# Patient Record
Sex: FEMALE | Race: OTHER
[De-identification: names, ages, dates, MRNs, and addresses within clinical notes are randomized per-mention and may not be internally consistent; named-entity substitution may affect disease eponyms.]

---

## 2021-09-25 ENCOUNTER — HOSPITAL ENCOUNTER (EMERGENCY)
Dept: HOSPITAL 41 - JD.ED | Age: 31
Discharge: HOME | End: 2021-09-25
Payer: SELF-PAY

## 2021-09-25 DIAGNOSIS — U07.1: Primary | ICD-10-CM

## 2021-09-25 PROCEDURE — U0002 COVID-19 LAB TEST NON-CDC: HCPCS

## 2021-09-25 NOTE — EDM.PDOC
ED HPI GENERAL MEDICAL PROBLEM





- General


Chief Complaint: General


Stated Complaint: COVID SYMPTOMS


Time Seen by Provider: 09/25/21 14:48


Source of Information: Reports: Patient, Family, RN Notes Reviewed


History Limitations: Reports: No Limitations





- History of Present Illness


INITIAL COMMENTS - FREE TEXT/NARRATIVE: 


Patient is a 31-year-old female presenting to the emergency department with 

complaints of 1 week history of fever, chills, body aches, nausea and cough.  

Patient reports that she did vomit 1 time yesterday but none since.  She has had

no diarrhea but does complain of body aches, particularly back pain.  Denies any

significant chest pain.  Her family is visiting from [place] and her entire 

family is sick with similar symptoms.  They did not receive Covid vaccinations. 

She denies any chronic medical conditions.  She has not had any over-the-counter

medications today; however, states that when she does take ibuprofen it does 

help with her back pain, however the pain returns once the medication wears off.

 


  ** Generalized


Pain Score (Numeric/FACES): 10





- Related Data


                                    Allergies











Allergy/AdvReac Type Severity Reaction Status Date / Time


 


No Known Allergies Allergy   Verified 09/25/21 15:13











Home Meds: 


                                    Home Meds





Ondansetron [Zofran ODT] 4 mg PO Q6H PRN #10 tab.dis 09/25/21 [Rx]











Past Medical History


OB/GYN History: Reports: Pregnancy





Social & Family History





- Tobacco Use


Tobacco Use Status *Q: Never Tobacco User


Second Hand Smoke Exposure: No





- Caffeine Use


Caffeine Use: Reports: None





- Recreational Drug Use


Recreational Drug Use: No





ED ROS GENERAL





- Review of Systems


Review Of Systems: Comprehensive ROS is negative, except as noted in HPI.





ED EXAM, GENERAL





- Physical Exam


Exam: See Below


Exam Limited By: No Limitations


General Appearance: Alert, WD/WN, No Apparent Distress


Respiratory/Chest: No Respiratory Distress, Lungs Clear, Normal Breath Sounds, 

No Accessory Muscle Use, Chest Non-Tender, Other (Occasional, dry cough with 

deep breathing)


Cardiovascular: Normal Peripheral Pulses, Regular Rate, Rhythm, No Edema, No 

Gallop, No JVD, No Murmur, No Rub


GI/Abdominal: Normal Bowel Sounds, Soft, Non-Tender, No Organomegaly, No 

Distention, No Abnormal Bruit, No Mass


Neurological: Alert, Oriented, CN II-XII Intact, Normal Cognition, Normal Gait, 

Normal Reflexes, No Motor/Sensory Deficits


Psychiatric: Normal Affect, Normal Mood


Skin Exam: Warm, Dry, Intact, Normal Color, No Rash





Course





- Vital Signs


Last Recorded V/S: 


                                Last Vital Signs











Temp  97.9 F   09/25/21 15:10


 


Pulse  89   09/25/21 15:10


 


Resp  18   09/25/21 15:10


 


BP  104/80   09/25/21 15:10


 


Pulse Ox  98   09/25/21 15:10














- Orders/Labs/Meds


Labs: 


                                Laboratory Tests











  09/25/21 Range/Units





  15:00 


 


SARS-CoV-2 RNA ([facility])  Positive H  (NEGATIVE)  














- Re-Assessments/Exams


Free Text/Narrative Re-Assessment/Exam: 


Patient is a 31-year-old female presenting to the emergency department with 

concerns of Covid symptoms.  Report 1 week history of fever, chills, cough, body

 aches, and nausea with a single episode of vomiting yesterday. Lung sounds are 

clear to auscultation.  Vital signs are normal with oxygen saturation of 98% on 

room air.  I have ordered Covid testing.





09/25/21 16:18


Patient's Covid test is positive.  Discussed symptomatic treatment with patient.

  I will provide prescription for Zofran for nausea.  Discussed return prec

autions.  Discharge instructions as documented.


09/25/21 16:46


On discharge, patient notified nurse that she is allergic to any medication for 

vomiting.  This is not mentioned on triage.  Will cancel prescription for 

Zofran.





Departure





- Departure


Time of Disposition: 16:19


Disposition: Home, Self-Care 01


Condition: Good


Clinical Impression: 


 COVID-19








- Discharge Information


*PRESCRIPTION DRUG MONITORING PROGRAM REVIEWED*: No


*COPY OF PRESCRIPTION DRUG MONITORING REPORT IN PATIENT ANGELO: No


Instructions:  COVID-19


Referrals: 


PCP,None [Primary Care Provider] - 


Forms:  ED Department Discharge


Additional Instructions: 


You were seen in the emergency department today for evaluation of Covid 

symptoms.  Covid testing was completed and was found to be positive.  Recommend 

increase fluid intake.  Use Tylenol and ibuprofen routinely to treat fever and 

bodyaches.  You have been provided a prescription for Zofran for nausea.  Use 

this as prescribed.  If you feel that your symptoms are worsening in any way, 

please not hesitate to return to the emergency department for reevaluation.


 

--------------------------------------------------------------------------------


--------------------------------------------------------------------------------


---------------------------


Hoy lo vieron en el departamento de emergencias para arielle evaluacin de los 

sntomas de Covid. La prueba de Covid se complet y result positiva. Recomiende

aumentar la ingesta de lquidos. Use Tylenol e ibuprofeno de forma rutinaria 

para tratar la fiebre y los bolivar de cuerpo. Le jones proporcionado arielle receta 

de Zofran para las nuseas. Use esto segn lo prescrito. Si siente que long 

sntomas estn empeorando de alguna manera, no dude en regresar al departamento 

de emergencias para arielle reevaluacin.





Sepsis Event Note (ED)





- Focused Exam


Vital Signs: 


                                   Vital Signs











  Temp Pulse Resp BP Pulse Ox


 


 09/25/21 15:10  97.9 F  89  18  104/80  98